# Patient Record
Sex: MALE | Race: WHITE | Employment: FULL TIME | URBAN - METROPOLITAN AREA
[De-identification: names, ages, dates, MRNs, and addresses within clinical notes are randomized per-mention and may not be internally consistent; named-entity substitution may affect disease eponyms.]

---

## 2022-11-12 ENCOUNTER — HOSPITAL ENCOUNTER (EMERGENCY)
Age: 32
Discharge: HOME | End: 2022-11-12
Payer: COMMERCIAL

## 2022-11-12 ENCOUNTER — APPOINTMENT (OUTPATIENT)
Dept: GENERAL RADIOLOGY | Age: 32
End: 2022-11-12
Payer: COMMERCIAL

## 2022-11-12 VITALS
BODY MASS INDEX: 28.93 KG/M2 | HEIGHT: 72 IN | HEART RATE: 89 BPM | DIASTOLIC BLOOD PRESSURE: 70 MMHG | SYSTOLIC BLOOD PRESSURE: 125 MMHG | WEIGHT: 213.6 LBS | RESPIRATION RATE: 18 BRPM | TEMPERATURE: 98.9 F | OXYGEN SATURATION: 97 %

## 2022-11-12 DIAGNOSIS — M79.645 PAIN OF LEFT THUMB: Primary | ICD-10-CM

## 2022-11-12 PROCEDURE — 73140 X-RAY EXAM OF FINGER(S): CPT

## 2022-11-12 PROCEDURE — 99283 EMERGENCY DEPT VISIT LOW MDM: CPT

## 2022-11-12 ASSESSMENT — PAIN DESCRIPTION - ORIENTATION: ORIENTATION: RIGHT

## 2022-11-12 ASSESSMENT — PAIN - FUNCTIONAL ASSESSMENT
PAIN_FUNCTIONAL_ASSESSMENT: 0-10
PAIN_FUNCTIONAL_ASSESSMENT: 0-10

## 2022-11-12 ASSESSMENT — PAIN SCALES - GENERAL
PAINLEVEL_OUTOF10: 7
PAINLEVEL_OUTOF10: 3

## 2022-11-12 ASSESSMENT — PAIN DESCRIPTION - LOCATION: LOCATION: HAND

## 2022-11-12 NOTE — ED PROVIDER NOTES
[address] ENCOUNTER          PHYSICIAN ASSISTANT NOTE     Date of evaluation: 11/12/2022    Chief Complaint     Hand Injury (Right)      History of Present Illness     Manan Espinoza is a 32 y.o. male with no significant past medical history presents today for left thumb injury. Patient slipped on snow and landed on an outstretched left thumb. He now has pain near the thumb base. Pain worsens when he flexes and extends the thumb. He has not taken medications for symptoms. No history of injuries to this area. He is right-hand dominant and this is on the left thumb    Review of Systems     A complete review of systems was performed and negative except as stated in the HPI. Past Medical, Surgical, Family, and Social History     He has no past medical history on file. He has no past surgical history on file. His family history is not on file. He reports that he has never smoked. He has never used smokeless tobacco. He reports that he does not drink alcohol and does not use drugs. Medications     Previous Medications    IBUPROFEN (ADVIL;MOTRIN) 600 MG TABLET    Take 1 tablet by mouth every 8 hours as needed for Pain       Allergies     He is allergic to sulfa antibiotics. Physical Exam     INITIAL VITALS: BP: 125/70, Temp: 98.9 °F (37.2 °C), Heart Rate: 89, Resp: 18, SpO2: 97 %     General: Well appearing, well nourished, in no apparent state of distress. HEENT:  Normocephalic, atraumatic. Pupils equal, sclera white. Handling secretions without difficulty. Neck: No meningismus. Trachea midline    Pulmonary: Respirations even. Non labored. No tachypnea. Cardiac: Chest symmetrical and non-tender on palpation of chest wall. Abdomen:  Non-distended. Musculoskeletal:  Ambulates under own control. Tenderness around the left first MCP joint with possible laxity on stressing of the UCL.   No pain at the scaphoid region or metacarpals    Neuro:  Alert and oriented x 3. CN II - XII grossly intact. Moves all extremities spontaneously. Vascular:  2+ peripheral pulses in bilateral upper and lower extremities      Skin:  Warm and well perfused without rashes or lesions    Psych:  Appropriate mood and affect        Diagnostic Results         RADIOLOGY:  XR FINGER LEFT (MIN 2 VIEWS)   Final Result      No acute injury. LABS:   No results found for this visit on 11/12/22. ED BEDSIDE ULTRASOUND:      RECENT VITALS:  BP: 125/70, Temp: 98.9 °F (37.2 °C), Heart Rate: 89, Resp: 18, SpO2: 97 %     Procedures         ED Course     Nursing Notes, Past Medical Hx, Past Surgical Hx, Social Hx, Allergies, and Family Hx were reviewed. The patient was given the following medications:  No orders of the defined types were placed in this encounter. CONSULTS:  None    MEDICAL DECISION MAKING / ASSESSMENT / Rolf Rios is a 32 y.o. male presents for evaluation of left thumb pain with a mechanism and exam concerning for a UCL injury. X-ray negative. Placed in a thumb spica and will follow up with hand surgery. This patient was seen independently per departmental protocol and, thus, no cosign is required. Some of this note was dictated using voice recognition software. As a result, unintended errors in grammar or spelling may exist.    Clinical Impression     1.  Pain of left thumb        Disposition     PATIENT REFERRED TO:  Brit Fortune MD  [address] [place] [address]  [phone]            DISCHARGE MEDICATIONS:  New Prescriptions    No medications on file       DISPOSITION Decision To Discharge 11/12/2022 05:09:28 PM        Gustavo Hoffmann PA-C  11/12/22 [id number]

## 2022-11-12 NOTE — ED TRIAGE NOTES
Pt came into the ED after a fall approx 1300hrs today. Pt states that he slipped and tried to brace his fall with his right hand. Denies hearing any snapping or popping, just a dull pain.

## 2022-11-12 NOTE — DISCHARGE INSTRUCTIONS
The best way to control your pain is by using several common medications that treat the pain in different ways. 1) Start by taking ibuprofen 400mg three times a day WITH meals. Ibuprofen over the counter comes in 200mg strength at its basic dose and you can increase or decrease the dose up to 800mg. You may take up to 800mg three times per day, but do not exceed this dose. 2) Supplement with tylenol IN BETWEEN doses of ibuprofen. This medication has a different mechanism than ibuprofen. It comes in 325mg in its most basic over the counter format. Take 650 mg (2 tablets) three times a day in between doses of ibuprofen. DO NOT take more than 4000 mg of tylenol in one day. 3) Ice therapy: Ice the area of greatest pain for 30 minutes at a time three times per day. Do this consistently for 3-4 days and you will see improvement. If your pain dramatically worsens, changes locations, or you have other questions or concerns please call your family doctor or return to the ER for evaluation.

## 2022-11-15 SDOH — HEALTH STABILITY: PHYSICAL HEALTH: ON AVERAGE, HOW MANY DAYS PER WEEK DO YOU ENGAGE IN MODERATE TO STRENUOUS EXERCISE (LIKE A BRISK WALK)?: 2 DAYS

## 2022-11-15 SDOH — HEALTH STABILITY: PHYSICAL HEALTH: ON AVERAGE, HOW MANY MINUTES DO YOU ENGAGE IN EXERCISE AT THIS LEVEL?: 90 MIN

## 2022-11-16 ENCOUNTER — OFFICE VISIT (OUTPATIENT)
Dept: ORTHOPEDIC SURGERY | Age: 32
End: 2022-11-16
Payer: COMMERCIAL

## 2022-11-16 VITALS — HEIGHT: 72 IN | WEIGHT: 213 LBS | BODY MASS INDEX: 28.85 KG/M2

## 2022-11-16 DIAGNOSIS — S63.642A SPRAIN OF METACARPOPHALANGEAL (MCP) JOINT OF LEFT THUMB, INITIAL ENCOUNTER: Primary | ICD-10-CM

## 2022-11-16 PROCEDURE — 99203 OFFICE O/P NEW LOW 30 MIN: CPT | Performed by: ORTHOPAEDIC SURGERY

## 2022-11-16 NOTE — PROGRESS NOTES
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 He noticed pain,  mild swelling, and minimal bruising. He was evaluated at the [facility] were obtained, read as negative for fracture or dislocation and the patient was braced and referred for hand/upper extremity evaluation and treatment. There is no history of additional significant injury. Symptoms have improved since the date of injury. The pain assessment has been reviewed and is correct. The patient's social history, past medical history, family history, medications, allergies and review of systems, entered 11/16/22,  have been reviewed, and dated and are recorded in the chart. On physical examination the patient is Height: 6' (182.9 cm) tall and weighs Weight: 213 lb (96.6 kg). Respirations are 18 per minute. The patient is well nourished, is oriented to time and place, demonstrates appropriate mood and affect as well as normal gait and station. There is no soft tissue swelling present about the left thumb. There is  a trace of  discoloration. There is no deformity. Tenderness is not present on palpation in the area of the left thumb MPJ collateral ligaments. There is no palpable Stener lesion. Range of motion of the hand and thumb is normal bilaterally and is accompanied by mild pain on the left. Skin is intact, as is distal circulation and sensation. Gross muscle strength is normal bilaterally   Hand and wrist joints are stable, including the MP joint of the left thumb. .  There are no subcutaneous nodules or enlarged epitrochlear lymph nodes.     I have personally reviewed and interpreted all previous external imaging studies(hand Xrays), laboratory tests, diagnostic procedures and medical encounters pertinent to this patient's visit today. Xrays: Review and independent interpretation of the recent external Xrays of the left hand demonstrate no fracture or dislocation. The radiologist's report concurs. Impression: Low grade sprain MP joint left thumb. The patient is furnished with a copy of the radiologist's report and, at his request, copies of the Xray images. The nature of this medical problem is fully discussed with the patient, including all treatment options. All questions are answered. He is instructed to limit the use of the brace and discontinue it's use over the next several days. He is instructed about activity precautions and a range of motion exercise program, which is fully discussed and demonstrated. The usual course of events in the resolution of the symptoms associated with this condition is fully discussed with the patient. As long as they progress as expected, they do not need to return for further follow up. They are, however, urged to call or return if they have questions or concerns or if full painless function of their thumb has not returned by 3-4 weeks from today.